# Patient Record
Sex: MALE | Race: OTHER | NOT HISPANIC OR LATINO | URBAN - METROPOLITAN AREA
[De-identification: names, ages, dates, MRNs, and addresses within clinical notes are randomized per-mention and may not be internally consistent; named-entity substitution may affect disease eponyms.]

---

## 2024-06-03 VITALS
OXYGEN SATURATION: 96 % | WEIGHT: 214.29 LBS | HEART RATE: 78 BPM | SYSTOLIC BLOOD PRESSURE: 182 MMHG | HEIGHT: 68 IN | TEMPERATURE: 99 F | RESPIRATION RATE: 16 BRPM | DIASTOLIC BLOOD PRESSURE: 97 MMHG

## 2024-06-03 RX ORDER — POVIDONE-IODINE 5 %
1 AEROSOL (ML) TOPICAL ONCE
Refills: 0 | Status: COMPLETED | OUTPATIENT
Start: 2024-06-04 | End: 2024-06-04

## 2024-06-03 NOTE — H&P ADULT - NSHPLABSRESULTS_GEN_ALL_CORE
Preop CBC, BMP, UA - WNL per medical clearance   Coags DOS  Cr .83   A1C 7.8   H/H 13.7/39.1  Preop EKG - WNL per medical clearance    DOS

## 2024-06-03 NOTE — H&P ADULT - NSHPPHYSICALEXAM_GEN_ALL_CORE
MSK: + decreased ROM 2/2 pain, thoracolumbar spine       Remainder of exam per medical clearance note MSK: + decreased ROM 2/2 pain, thoracolumbar spine   Skin warm and well perfused, no visible wounds/erythema/ecchymoses  FHL/GS 4/5 bilateral lower extremities  Right lower extremity: EHL 2/5, TA 2/5   Left lower extremity:  EHL 3/5, TA 3/5   SLT in tact to distal bilateral lower extremities    DP pulses palpable bilaterally   Remainder of exam per medical clearance note

## 2024-06-03 NOTE — H&P ADULT - PROBLEM SELECTOR PLAN 1
Admit to Orthopaedic Service.  Presents today for elective T9-T12 Decompression, removal of implants, exploration of fusion, posterior spinal fusion T8-S1 with sacropelvic fixation  Pt medically stable and cleared for procedure today by Dr. Fontenot Admit to Orthopaedic Service.  Presents today for elective  L2-S1 decompression, L5-S1 transforaminal lumbar interbody fusion, posterior spinal fusion  Pt medically stable and cleared for procedure today by Dr. Fontenot

## 2024-06-03 NOTE — PATIENT PROFILE ADULT - FALL HARM RISK - UNIVERSAL INTERVENTIONS
Bed in lowest position, wheels locked, appropriate side rails in place/Call bell, personal items and telephone in reach/Instruct patient to call for assistance before getting out of bed or chair/Non-slip footwear when patient is out of bed/[place] to call system/Physically safe environment - no spills, clutter or unnecessary equipment/Purposeful Proactive Rounding/Room/bathroom lighting operational, light cord in reach

## 2024-06-03 NOTE — H&P ADULT - HISTORY OF PRESENT ILLNESS
63M c/o back pain x       Present for elective T9-T12 Decompression, removal of implants, exploration of fusion, posterior spinal fusion T8-S1 with sacropelvic fixation 63M c/o back pain x chronic. Pt has hx of prior spine surgery in 1999. Reports since then he has had increasing weakness in bilateral feet. He denies numbness/tingling of bilateral lower extremities. He does not ambulate with an assistive device at baseline. Denies DVT hx; denies CP,SOB,N/V, tactile fevers, calf pain.       Present for elective L2-S1 decompression, L5-S1 transforaminal lumbar interbody fusion, posterior spinal fusion

## 2024-06-04 ENCOUNTER — INPATIENT (INPATIENT)
Facility: HOSPITAL | Age: 64
LOS: 1 days | Discharge: HOME | DRG: 460 | End: 2024-06-06
Attending: ORTHOPAEDIC SURGERY | Admitting: ORTHOPAEDIC SURGERY
Payer: MEDICARE

## 2024-06-04 DIAGNOSIS — M54.9 DORSALGIA, UNSPECIFIED: ICD-10-CM

## 2024-06-04 DIAGNOSIS — Z98.890 OTHER SPECIFIED POSTPROCEDURAL STATES: Chronic | ICD-10-CM

## 2024-06-04 DIAGNOSIS — Z85.46 PERSONAL HISTORY OF MALIGNANT NEOPLASM OF PROSTATE: Chronic | ICD-10-CM

## 2024-06-04 LAB
BASE EXCESS BLDA CALC-SCNC: -3 MMOL/L — LOW (ref -2–3)
BLD GP AB SCN SERPL QL: NEGATIVE — SIGNIFICANT CHANGE UP
CA-I BLDA-SCNC: 1.17 MMOL/L — SIGNIFICANT CHANGE UP (ref 1.15–1.33)
CO2 BLDA-SCNC: 22 MMOL/L — SIGNIFICANT CHANGE UP (ref 19–24)
COHGB MFR BLDA: 1.9 % — SIGNIFICANT CHANGE UP
GLUCOSE BLDA-MCNC: 176 MG/DL — HIGH (ref 70–99)
GLUCOSE BLDC GLUCOMTR-MCNC: 170 MG/DL — HIGH (ref 70–99)
GLUCOSE BLDC GLUCOMTR-MCNC: 224 MG/DL — HIGH (ref 70–99)
GLUCOSE BLDC GLUCOMTR-MCNC: 225 MG/DL — HIGH (ref 70–99)
HCO3 BLDA-SCNC: 21 MMOL/L — SIGNIFICANT CHANGE UP (ref 21–28)
HGB BLDA-MCNC: 12.2 G/DL — LOW (ref 12.6–17.4)
METHGB MFR BLDA: 0.6 % — SIGNIFICANT CHANGE UP
OXYHGB MFR BLDA: 97.5 % — HIGH (ref 90–95)
PCO2 BLDA: 33 MMHG — LOW (ref 35–48)
PH BLDA: 7.41 — SIGNIFICANT CHANGE UP (ref 7.35–7.45)
PO2 BLDA: 396 MMHG — HIGH (ref 83–108)
POTASSIUM BLDA-SCNC: 4.4 MMOL/L — SIGNIFICANT CHANGE UP (ref 3.5–5.1)
RH IG SCN BLD-IMP: POSITIVE — SIGNIFICANT CHANGE UP
SAO2 % BLDA: 100 % — HIGH (ref 94–98)
SODIUM BLDA-SCNC: 137 MMOL/L — SIGNIFICANT CHANGE UP (ref 136–145)

## 2024-06-04 PROCEDURE — 63030 LAMOT DCMPRN NRV RT 1 LMBR: CPT | Mod: AS,50,59

## 2024-06-04 PROCEDURE — 22630 ARTHRD PST TQ 1NTRSPC LUM: CPT | Mod: AS

## 2024-06-04 PROCEDURE — 63035 LAMOT DCMPRN NRV RT EA ADDL: CPT | Mod: AS,50,59

## 2024-06-04 DEVICE — SURGIFLO HEMOSTATIC MATRIX KIT: Type: IMPLANTABLE DEVICE | Status: FUNCTIONAL

## 2024-06-04 DEVICE — SURGIFOAM PAD 8CM X 12.5CM X 10MM (100): Type: IMPLANTABLE DEVICE | Status: FUNCTIONAL

## 2024-06-04 DEVICE — IMPLANTABLE DEVICE: Type: IMPLANTABLE DEVICE | Status: FUNCTIONAL

## 2024-06-04 DEVICE — SCREW SET: Type: IMPLANTABLE DEVICE | Status: FUNCTIONAL

## 2024-06-04 DEVICE — SCREW SOLERA 7.5X45X5.5: Type: IMPLANTABLE DEVICE | Status: FUNCTIONAL

## 2024-06-04 RX ORDER — SODIUM CHLORIDE 9 MG/ML
1000 INJECTION, SOLUTION INTRAVENOUS
Refills: 0 | Status: DISCONTINUED | OUTPATIENT
Start: 2024-06-04 | End: 2024-06-06

## 2024-06-04 RX ORDER — OXYCODONE HYDROCHLORIDE 5 MG/1
10 TABLET ORAL EVERY 4 HOURS
Refills: 0 | Status: DISCONTINUED | OUTPATIENT
Start: 2024-06-04 | End: 2024-06-06

## 2024-06-04 RX ORDER — RAMIPRIL 5 MG
0 CAPSULE ORAL
Refills: 0 | DISCHARGE

## 2024-06-04 RX ORDER — ENOXAPARIN SODIUM 100 MG/ML
40 INJECTION SUBCUTANEOUS EVERY 24 HOURS
Refills: 0 | Status: DISCONTINUED | OUTPATIENT
Start: 2024-06-05 | End: 2024-06-06

## 2024-06-04 RX ORDER — CHLORHEXIDINE GLUCONATE 213 G/1000ML
1 SOLUTION TOPICAL EVERY 12 HOURS
Refills: 0 | Status: DISCONTINUED | OUTPATIENT
Start: 2024-06-04 | End: 2024-06-04

## 2024-06-04 RX ORDER — CEFAZOLIN SODIUM 1 G
2000 VIAL (EA) INJECTION EVERY 8 HOURS
Refills: 0 | Status: COMPLETED | OUTPATIENT
Start: 2024-06-04 | End: 2024-06-05

## 2024-06-04 RX ORDER — HYDROMORPHONE HYDROCHLORIDE 2 MG/ML
0.5 INJECTION INTRAMUSCULAR; INTRAVENOUS; SUBCUTANEOUS
Refills: 0 | Status: DISCONTINUED | OUTPATIENT
Start: 2024-06-04 | End: 2024-06-06

## 2024-06-04 RX ORDER — MONTELUKAST 4 MG/1
10 TABLET, CHEWABLE ORAL DAILY
Refills: 0 | Status: DISCONTINUED | OUTPATIENT
Start: 2024-06-04 | End: 2024-06-06

## 2024-06-04 RX ORDER — ATORVASTATIN CALCIUM 80 MG/1
40 TABLET, FILM COATED ORAL AT BEDTIME
Refills: 0 | Status: DISCONTINUED | OUTPATIENT
Start: 2024-06-04 | End: 2024-06-06

## 2024-06-04 RX ORDER — NEBIVOLOL HYDROCHLORIDE 5 MG/1
1 TABLET ORAL
Refills: 0 | DISCHARGE

## 2024-06-04 RX ORDER — ATORVASTATIN CALCIUM 80 MG/1
1 TABLET, FILM COATED ORAL
Refills: 0 | DISCHARGE

## 2024-06-04 RX ORDER — SENNA PLUS 8.6 MG/1
2 TABLET ORAL AT BEDTIME
Refills: 0 | Status: DISCONTINUED | OUTPATIENT
Start: 2024-06-04 | End: 2024-06-06

## 2024-06-04 RX ORDER — DULAGLUTIDE 4.5 MG/.5ML
1.5 INJECTION, SOLUTION SUBCUTANEOUS
Refills: 0 | DISCHARGE

## 2024-06-04 RX ORDER — LISINOPRIL 2.5 MG/1
20 TABLET ORAL DAILY
Refills: 0 | Status: DISCONTINUED | OUTPATIENT
Start: 2024-06-04 | End: 2024-06-06

## 2024-06-04 RX ORDER — PANTOPRAZOLE SODIUM 20 MG/1
40 TABLET, DELAYED RELEASE ORAL
Refills: 0 | Status: DISCONTINUED | OUTPATIENT
Start: 2024-06-04 | End: 2024-06-06

## 2024-06-04 RX ORDER — POLYETHYLENE GLYCOL 3350 17 G/17G
17 POWDER, FOR SOLUTION ORAL
Refills: 0 | Status: DISCONTINUED | OUTPATIENT
Start: 2024-06-04 | End: 2024-06-06

## 2024-06-04 RX ORDER — OXYCODONE HYDROCHLORIDE 5 MG/1
5 TABLET ORAL EVERY 4 HOURS
Refills: 0 | Status: DISCONTINUED | OUTPATIENT
Start: 2024-06-04 | End: 2024-06-06

## 2024-06-04 RX ORDER — INSULIN LISPRO 100/ML
VIAL (ML) SUBCUTANEOUS
Refills: 0 | Status: DISCONTINUED | OUTPATIENT
Start: 2024-06-04 | End: 2024-06-06

## 2024-06-04 RX ORDER — APREPITANT 80 MG/1
40 CAPSULE ORAL ONCE
Refills: 0 | Status: COMPLETED | OUTPATIENT
Start: 2024-06-04 | End: 2024-06-04

## 2024-06-04 RX ORDER — DEXTROSE 10 % IN WATER 10 %
125 INTRAVENOUS SOLUTION INTRAVENOUS ONCE
Refills: 0 | Status: DISCONTINUED | OUTPATIENT
Start: 2024-06-04 | End: 2024-06-06

## 2024-06-04 RX ORDER — DORZOLAMIDE HYDROCHLORIDE TIMOLOL MALEATE 20; 5 MG/ML; MG/ML
1 SOLUTION/ DROPS OPHTHALMIC
Refills: 0 | DISCHARGE

## 2024-06-04 RX ORDER — DORZOLAMIDE HYDROCHLORIDE TIMOLOL MALEATE 20; 5 MG/ML; MG/ML
1 SOLUTION/ DROPS OPHTHALMIC
Refills: 0 | Status: DISCONTINUED | OUTPATIENT
Start: 2024-06-04 | End: 2024-06-06

## 2024-06-04 RX ORDER — FEBUXOSTAT 40 MG/1
1 TABLET ORAL
Refills: 0 | DISCHARGE

## 2024-06-04 RX ORDER — DEXTROSE 50 % IN WATER 50 %
12.5 SYRINGE (ML) INTRAVENOUS ONCE
Refills: 0 | Status: DISCONTINUED | OUTPATIENT
Start: 2024-06-04 | End: 2024-06-06

## 2024-06-04 RX ORDER — ACETAMINOPHEN 500 MG
1000 TABLET ORAL EVERY 8 HOURS
Refills: 0 | Status: DISCONTINUED | OUTPATIENT
Start: 2024-06-04 | End: 2024-06-06

## 2024-06-04 RX ORDER — METFORMIN HYDROCHLORIDE 850 MG/1
1 TABLET ORAL
Refills: 0 | DISCHARGE

## 2024-06-04 RX ORDER — HYDROMORPHONE HYDROCHLORIDE 2 MG/ML
0.5 INJECTION INTRAMUSCULAR; INTRAVENOUS; SUBCUTANEOUS EVERY 4 HOURS
Refills: 0 | Status: DISCONTINUED | OUTPATIENT
Start: 2024-06-04 | End: 2024-06-06

## 2024-06-04 RX ORDER — DEXTROSE 50 % IN WATER 50 %
15 SYRINGE (ML) INTRAVENOUS ONCE
Refills: 0 | Status: DISCONTINUED | OUTPATIENT
Start: 2024-06-04 | End: 2024-06-06

## 2024-06-04 RX ORDER — DEXTROSE 50 % IN WATER 50 %
25 SYRINGE (ML) INTRAVENOUS ONCE
Refills: 0 | Status: DISCONTINUED | OUTPATIENT
Start: 2024-06-04 | End: 2024-06-06

## 2024-06-04 RX ORDER — METHOCARBAMOL 500 MG/1
500 TABLET, FILM COATED ORAL EVERY 8 HOURS
Refills: 0 | Status: DISCONTINUED | OUTPATIENT
Start: 2024-06-04 | End: 2024-06-06

## 2024-06-04 RX ORDER — MONTELUKAST 4 MG/1
1 TABLET, CHEWABLE ORAL
Refills: 0 | DISCHARGE

## 2024-06-04 RX ORDER — DEXAMETHASONE 0.5 MG/5ML
4 ELIXIR ORAL EVERY 6 HOURS
Refills: 0 | Status: DISCONTINUED | OUTPATIENT
Start: 2024-06-04 | End: 2024-06-05

## 2024-06-04 RX ORDER — ACETAMINOPHEN 500 MG
1000 TABLET ORAL ONCE
Refills: 0 | Status: COMPLETED | OUTPATIENT
Start: 2024-06-04 | End: 2024-06-04

## 2024-06-04 RX ORDER — ONDANSETRON 8 MG/1
4 TABLET, FILM COATED ORAL EVERY 6 HOURS
Refills: 0 | Status: DISCONTINUED | OUTPATIENT
Start: 2024-06-04 | End: 2024-06-06

## 2024-06-04 RX ORDER — GLUCAGON INJECTION, SOLUTION 0.5 MG/.1ML
1 INJECTION, SOLUTION SUBCUTANEOUS ONCE
Refills: 0 | Status: DISCONTINUED | OUTPATIENT
Start: 2024-06-04 | End: 2024-06-06

## 2024-06-04 RX ORDER — BIMATOPROST 0.3 MG/ML
1 SOLUTION/ DROPS OPHTHALMIC
Refills: 0 | DISCHARGE

## 2024-06-04 RX ORDER — LATANOPROST 0.05 MG/ML
1 SOLUTION/ DROPS OPHTHALMIC; TOPICAL AT BEDTIME
Refills: 0 | Status: DISCONTINUED | OUTPATIENT
Start: 2024-06-04 | End: 2024-06-06

## 2024-06-04 RX ORDER — SEMAGLUTIDE 0.68 MG/ML
1 INJECTION, SOLUTION SUBCUTANEOUS
Refills: 0 | DISCHARGE

## 2024-06-04 RX ADMIN — Medication 100 MILLIGRAM(S): at 21:20

## 2024-06-04 RX ADMIN — Medication 4 MILLIGRAM(S): at 18:12

## 2024-06-04 RX ADMIN — Medication 1000 MILLIGRAM(S): at 21:19

## 2024-06-04 RX ADMIN — OXYCODONE HYDROCHLORIDE 10 MILLIGRAM(S): 5 TABLET ORAL at 19:53

## 2024-06-04 RX ADMIN — DORZOLAMIDE HYDROCHLORIDE TIMOLOL MALEATE 1 DROP(S): 20; 5 SOLUTION/ DROPS OPHTHALMIC at 19:22

## 2024-06-04 RX ADMIN — OXYCODONE HYDROCHLORIDE 10 MILLIGRAM(S): 5 TABLET ORAL at 20:47

## 2024-06-04 RX ADMIN — Medication 4: at 22:38

## 2024-06-04 RX ADMIN — Medication 1 APPLICATION(S): at 06:52

## 2024-06-04 RX ADMIN — ATORVASTATIN CALCIUM 40 MILLIGRAM(S): 80 TABLET, FILM COATED ORAL at 21:20

## 2024-06-04 RX ADMIN — Medication 1000 MILLIGRAM(S): at 06:50

## 2024-06-04 RX ADMIN — CHLORHEXIDINE GLUCONATE 1 APPLICATION(S): 213 SOLUTION TOPICAL at 06:51

## 2024-06-04 RX ADMIN — METHOCARBAMOL 500 MILLIGRAM(S): 500 TABLET, FILM COATED ORAL at 21:19

## 2024-06-04 RX ADMIN — APREPITANT 40 MILLIGRAM(S): 80 CAPSULE ORAL at 06:50

## 2024-06-04 RX ADMIN — Medication 50 MILLIGRAM(S): at 21:19

## 2024-06-04 NOTE — PRE-ANESTHESIA EVALUATION ADULT - NSANTHOSAYNRD_GEN_A_CORE
no
No. CASE screening performed.  STOP BANG Legend: 0-2 = LOW Risk; 3-4 = INTERMEDIATE Risk; 5-8 = HIGH Risk

## 2024-06-04 NOTE — PHYSICAL THERAPY INITIAL EVALUATION ADULT - ADDITIONAL COMMENTS
Patient lives in private home with 2 MYLES and 1 flight inside. Patient has tub shower with grab bars and shower chair. Owns RW, SC, and commode

## 2024-06-04 NOTE — PHYSICAL THERAPY INITIAL EVALUATION ADULT - GAIT DEVIATIONS NOTED, PT EVAL
patient with mildly unsteady gait, no LOB, verbal cues for sequencing and management of RW, gait distance limited by lethargy/decreased lennie/decreased step length

## 2024-06-04 NOTE — PHYSICAL THERAPY INITIAL EVALUATION ADULT - PERTINENT HX OF CURRENT PROBLEM, REHAB EVAL
Patient is a 63 year old male c/o back pain x chronic. Pt has hx of prior spine surgery in 1999. Reports since then he has had increasing weakness in bilateral feet. He denies numbness/tingling of bilateral lower extremities. He does not ambulate with an assistive device at baseline. Denies DVT hx; denies CP,SOB,N/V, tactile fevers, calf pain.

## 2024-06-04 NOTE — BRIEF OPERATIVE NOTE - NSICDXBRIEFPROCEDURE_GEN_ALL_CORE_FT
PROCEDURES:  Fusion, posterior spinal column, lumbar, 1 level, posterior approach 04-Jun-2024 16:45:57 L2-S1 decompression Ortega Zimmerman

## 2024-06-04 NOTE — PHYSICAL THERAPY INITIAL EVALUATION ADULT - GENERAL OBSERVATIONS, REHAB EVAL
MAYNOR Douglas aware of intent to treat. Patient received semi-supine in NAD with +ECG +heplock +newton +bailey +hemovac x 2 +SHASHI x 1 MAYNOR Douglas aware of intent to treat. Patient received semi-supine in NAD with +ECG +heplock +newton +bailey +hemovac x 2 +SHASHI x 1 +LSO donned in sitting

## 2024-06-04 NOTE — PHYSICAL THERAPY INITIAL EVALUATION ADULT - MANUAL MUSCLE TESTING RESULTS, REHAB EVAL
BUE 5/5, L hip flexion 5/5, R hip flexion 4/5, L knee extension 5/5, R knee extension 4/5, L ankle DF/PF 5/5, R ankle DF 3+/5, L EHL 3/5, R EHL 1/5

## 2024-06-05 DIAGNOSIS — T78.40XA ALLERGY, UNSPECIFIED, INITIAL ENCOUNTER: ICD-10-CM

## 2024-06-05 DIAGNOSIS — D69.6 THROMBOCYTOPENIA, UNSPECIFIED: ICD-10-CM

## 2024-06-05 DIAGNOSIS — M48.061 SPINAL STENOSIS, LUMBAR REGION WITHOUT NEUROGENIC CLAUDICATION: ICD-10-CM

## 2024-06-05 DIAGNOSIS — I10 ESSENTIAL (PRIMARY) HYPERTENSION: ICD-10-CM

## 2024-06-05 DIAGNOSIS — D62 ACUTE POSTHEMORRHAGIC ANEMIA: ICD-10-CM

## 2024-06-05 DIAGNOSIS — H40.9 UNSPECIFIED GLAUCOMA: ICD-10-CM

## 2024-06-05 DIAGNOSIS — E11.9 TYPE 2 DIABETES MELLITUS WITHOUT COMPLICATIONS: ICD-10-CM

## 2024-06-05 DIAGNOSIS — M10.9 GOUT, UNSPECIFIED: ICD-10-CM

## 2024-06-05 LAB
ANION GAP SERPL CALC-SCNC: 10 MMOL/L — SIGNIFICANT CHANGE UP (ref 5–17)
APTT BLD: 24.6 SEC — SIGNIFICANT CHANGE UP (ref 24.5–35.6)
BASOPHILS # BLD AUTO: 0 K/UL — SIGNIFICANT CHANGE UP (ref 0–0.2)
BASOPHILS NFR BLD AUTO: 0 % — SIGNIFICANT CHANGE UP (ref 0–2)
BUN SERPL-MCNC: 14 MG/DL — SIGNIFICANT CHANGE UP (ref 7–23)
CALCIUM SERPL-MCNC: 8.5 MG/DL — SIGNIFICANT CHANGE UP (ref 8.4–10.5)
CHLORIDE SERPL-SCNC: 106 MMOL/L — SIGNIFICANT CHANGE UP (ref 96–108)
CO2 SERPL-SCNC: 23 MMOL/L — SIGNIFICANT CHANGE UP (ref 22–31)
CREAT SERPL-MCNC: 0.77 MG/DL — SIGNIFICANT CHANGE UP (ref 0.5–1.3)
EGFR: 99 ML/MIN/1.73M2 — SIGNIFICANT CHANGE UP
EOSINOPHIL # BLD AUTO: 0 K/UL — SIGNIFICANT CHANGE UP (ref 0–0.5)
EOSINOPHIL NFR BLD AUTO: 0 % — SIGNIFICANT CHANGE UP (ref 0–6)
GLUCOSE BLDC GLUCOMTR-MCNC: 195 MG/DL — HIGH (ref 70–99)
GLUCOSE BLDC GLUCOMTR-MCNC: 196 MG/DL — HIGH (ref 70–99)
GLUCOSE BLDC GLUCOMTR-MCNC: 221 MG/DL — HIGH (ref 70–99)
GLUCOSE BLDC GLUCOMTR-MCNC: 255 MG/DL — HIGH (ref 70–99)
GLUCOSE SERPL-MCNC: 190 MG/DL — HIGH (ref 70–99)
HCT VFR BLD CALC: 29.7 % — LOW (ref 39–50)
HGB BLD-MCNC: 9.9 G/DL — LOW (ref 13–17)
IMM GRANULOCYTES NFR BLD AUTO: 0.4 % — SIGNIFICANT CHANGE UP (ref 0–0.9)
INR BLD: 1.12 — SIGNIFICANT CHANGE UP (ref 0.85–1.18)
LYMPHOCYTES # BLD AUTO: 0.96 K/UL — LOW (ref 1–3.3)
LYMPHOCYTES # BLD AUTO: 9.8 % — LOW (ref 13–44)
MCHC RBC-ENTMCNC: 31.5 PG — SIGNIFICANT CHANGE UP (ref 27–34)
MCHC RBC-ENTMCNC: 33.3 GM/DL — SIGNIFICANT CHANGE UP (ref 32–36)
MCV RBC AUTO: 94.6 FL — SIGNIFICANT CHANGE UP (ref 80–100)
MONOCYTES # BLD AUTO: 0.63 K/UL — SIGNIFICANT CHANGE UP (ref 0–0.9)
MONOCYTES NFR BLD AUTO: 6.4 % — SIGNIFICANT CHANGE UP (ref 2–14)
NEUTROPHILS # BLD AUTO: 8.21 K/UL — HIGH (ref 1.8–7.4)
NEUTROPHILS NFR BLD AUTO: 83.4 % — HIGH (ref 43–77)
NRBC # BLD: 0 /100 WBCS — SIGNIFICANT CHANGE UP (ref 0–0)
PLATELET # BLD AUTO: 144 K/UL — LOW (ref 150–400)
POTASSIUM SERPL-MCNC: 3.7 MMOL/L — SIGNIFICANT CHANGE UP (ref 3.5–5.3)
POTASSIUM SERPL-SCNC: 3.7 MMOL/L — SIGNIFICANT CHANGE UP (ref 3.5–5.3)
PROTHROM AB SERPL-ACNC: 12.7 SEC — SIGNIFICANT CHANGE UP (ref 9.5–13)
RBC # BLD: 3.14 M/UL — LOW (ref 4.2–5.8)
RBC # FLD: 12.6 % — SIGNIFICANT CHANGE UP (ref 10.3–14.5)
SODIUM SERPL-SCNC: 139 MMOL/L — SIGNIFICANT CHANGE UP (ref 135–145)
WBC # BLD: 9.84 K/UL — SIGNIFICANT CHANGE UP (ref 3.8–10.5)
WBC # FLD AUTO: 9.84 K/UL — SIGNIFICANT CHANGE UP (ref 3.8–10.5)

## 2024-06-05 PROCEDURE — 99223 1ST HOSP IP/OBS HIGH 75: CPT

## 2024-06-05 RX ORDER — NEBIVOLOL HYDROCHLORIDE 5 MG/1
10 TABLET ORAL DAILY
Refills: 0 | Status: DISCONTINUED | OUTPATIENT
Start: 2024-06-05 | End: 2024-06-06

## 2024-06-05 RX ORDER — DEXAMETHASONE 0.5 MG/5ML
2 ELIXIR ORAL EVERY 8 HOURS
Refills: 0 | Status: COMPLETED | OUTPATIENT
Start: 2024-06-05 | End: 2024-06-06

## 2024-06-05 RX ORDER — ALLOPURINOL 300 MG
100 TABLET ORAL DAILY
Refills: 0 | Status: DISCONTINUED | OUTPATIENT
Start: 2024-06-05 | End: 2024-06-06

## 2024-06-05 RX ORDER — INSULIN GLARGINE 100 [IU]/ML
5 INJECTION, SOLUTION SUBCUTANEOUS AT BEDTIME
Refills: 0 | Status: DISCONTINUED | OUTPATIENT
Start: 2024-06-05 | End: 2024-06-06

## 2024-06-05 RX ADMIN — METHOCARBAMOL 500 MILLIGRAM(S): 500 TABLET, FILM COATED ORAL at 22:26

## 2024-06-05 RX ADMIN — DORZOLAMIDE HYDROCHLORIDE TIMOLOL MALEATE 1 DROP(S): 20; 5 SOLUTION/ DROPS OPHTHALMIC at 17:26

## 2024-06-05 RX ADMIN — DORZOLAMIDE HYDROCHLORIDE TIMOLOL MALEATE 1 DROP(S): 20; 5 SOLUTION/ DROPS OPHTHALMIC at 05:25

## 2024-06-05 RX ADMIN — Medication 50 MILLIGRAM(S): at 22:27

## 2024-06-05 RX ADMIN — Medication 2: at 22:26

## 2024-06-05 RX ADMIN — INSULIN GLARGINE 5 UNIT(S): 100 INJECTION, SOLUTION SUBCUTANEOUS at 22:28

## 2024-06-05 RX ADMIN — Medication 4 MILLIGRAM(S): at 00:06

## 2024-06-05 RX ADMIN — NEBIVOLOL HYDROCHLORIDE 10 MILLIGRAM(S): 5 TABLET ORAL at 13:30

## 2024-06-05 RX ADMIN — Medication 100 MILLIGRAM(S): at 05:25

## 2024-06-05 RX ADMIN — LISINOPRIL 20 MILLIGRAM(S): 2.5 TABLET ORAL at 08:47

## 2024-06-05 RX ADMIN — METHOCARBAMOL 500 MILLIGRAM(S): 500 TABLET, FILM COATED ORAL at 05:26

## 2024-06-05 RX ADMIN — Medication 6: at 17:26

## 2024-06-05 RX ADMIN — Medication 4 MILLIGRAM(S): at 05:26

## 2024-06-05 RX ADMIN — METHOCARBAMOL 500 MILLIGRAM(S): 500 TABLET, FILM COATED ORAL at 13:14

## 2024-06-05 RX ADMIN — Medication 100 MILLIGRAM(S): at 12:31

## 2024-06-05 RX ADMIN — PANTOPRAZOLE SODIUM 40 MILLIGRAM(S): 20 TABLET, DELAYED RELEASE ORAL at 05:26

## 2024-06-05 RX ADMIN — Medication 2: at 07:04

## 2024-06-05 RX ADMIN — Medication 1000 MILLIGRAM(S): at 22:27

## 2024-06-05 RX ADMIN — Medication 2 MILLIGRAM(S): at 22:26

## 2024-06-05 RX ADMIN — Medication 2 MILLIGRAM(S): at 13:14

## 2024-06-05 RX ADMIN — ATORVASTATIN CALCIUM 40 MILLIGRAM(S): 80 TABLET, FILM COATED ORAL at 22:26

## 2024-06-05 RX ADMIN — Medication 1000 MILLIGRAM(S): at 13:14

## 2024-06-05 RX ADMIN — Medication 4: at 13:13

## 2024-06-05 RX ADMIN — Medication 1000 MILLIGRAM(S): at 05:25

## 2024-06-05 RX ADMIN — MONTELUKAST 10 MILLIGRAM(S): 4 TABLET, CHEWABLE ORAL at 13:15

## 2024-06-05 RX ADMIN — ENOXAPARIN SODIUM 40 MILLIGRAM(S): 100 INJECTION SUBCUTANEOUS at 22:27

## 2024-06-05 RX ADMIN — Medication 50 MILLIGRAM(S): at 05:26

## 2024-06-05 RX ADMIN — Medication 50 MILLIGRAM(S): at 13:14

## 2024-06-05 NOTE — OCCUPATIONAL THERAPY INITIAL EVALUATION ADULT - PERTINENT HX OF CURRENT PROBLEM, REHAB EVAL
63M c/o back pain x chronic. Pt has hx of prior spine surgery in 1999. Reports since then he has had increasing weakness in bilateral feet. He denies numbness/tingling of bilateral lower extremities. He does not ambulate with an assistive device at baseline. Denies DVT hx; denies CP,SOB,N/V, tactile fevers, calf pain.

## 2024-06-05 NOTE — CONSULT NOTE ADULT - SUBJECTIVE AND OBJECTIVE BOX
HPI: 63 YOM with PMH of T2DM, HTN, gout, glaucoma, allergies admitted for elective L2-S1 decompression, L5-S1 PSF/TLIF s/p OR with Dr. Crain on 6/5/24. Patient seen in bed this morning, states pain has not been well controlled and 8/10 at time of my exam. Denies fever, vision changes, hearing changes, rhinorrhea, sore throat, chest pain, palpitations, cough, SOB, abd pain, N/V/D, rash, numbness/weakness/tingling, HA, LOC. Still with bailey which he states he would like removed ASAP - plan for TOV after PT eval per primary.       ROS: negative except as per HPI    PMH: as per HPI  PSH: as per HPI  Medications: reviewed  Allergies: reviewed  SH:  FH:    Diet, Consistent Carbohydrate/No Snacks (06-05-24 @ 09:50) [Active]      MEDICATIONS:  MEDICATIONS  (STANDING):  acetaminophen     Tablet .. 1000 milliGRAM(s) Oral every 8 hours  allopurinol 100 milliGRAM(s) Oral daily  atorvastatin 40 milliGRAM(s) Oral at bedtime  dexAMETHasone  Injectable 2 milliGRAM(s) IV Push every 8 hours  dextrose 10% Bolus 125 milliLiter(s) IV Bolus once  dextrose 5%. 1000 milliLiter(s) (100 mL/Hr) IV Continuous <Continuous>  dextrose 5%. 1000 milliLiter(s) (50 mL/Hr) IV Continuous <Continuous>  dextrose 50% Injectable 12.5 Gram(s) IV Push once  dextrose 50% Injectable 25 Gram(s) IV Push once  dorzolamide 2%/timolol 0.5% Ophthalmic Solution 1 Drop(s) Both EYES two times a day  enoxaparin Injectable 40 milliGRAM(s) SubCutaneous every 24 hours  glucagon  Injectable 1 milliGRAM(s) IntraMuscular once  insulin lispro (ADMELOG) corrective regimen sliding scale   SubCutaneous Before meals and at bedtime  lactated ringers. 1000 milliLiter(s) (140 mL/Hr) IV Continuous <Continuous>  latanoprost 0.005% Ophthalmic Solution 1 Drop(s) Both EYES at bedtime  lisinopril 20 milliGRAM(s) Oral daily  methocarbamol 500 milliGRAM(s) Oral every 8 hours  montelukast 10 milliGRAM(s) Oral daily  nebivolol 10 milliGRAM(s) Oral daily  pantoprazole    Tablet 40 milliGRAM(s) Oral before breakfast  polyethylene glycol 3350 17 Gram(s) Oral two times a day  pregabalin 50 milliGRAM(s) Oral three times a day  senna 2 Tablet(s) Oral at bedtime    MEDICATIONS  (PRN):  aluminum hydroxide/magnesium hydroxide/simethicone Suspension 30 milliLiter(s) Oral every 12 hours PRN Indigestion  bisacodyl 5 milliGRAM(s) Oral every 12 hours PRN Constipation  dextrose Oral Gel 15 Gram(s) Oral once PRN Blood Glucose LESS THAN 70 milliGRAM(s)/deciliter  HYDROmorphone  Injectable 0.5 milliGRAM(s) IV Push every 15 minutes PRN breakthrough pain  HYDROmorphone  Injectable 0.5 milliGRAM(s) IV Push every 4 hours PRN breakthrough pain  ondansetron Injectable 4 milliGRAM(s) IV Push every 6 hours PRN Nausea and/or Vomiting  oxyCODONE    IR 5 milliGRAM(s) Oral every 4 hours PRN Moderate Pain (4 - 6)  oxyCODONE    IR 10 milliGRAM(s) Oral every 4 hours PRN Severe Pain (7 - 10)      Allergies    No Known Allergies    Intolerances        OBJECTIVE:  Vital Signs Last 24 Hrs  T(C): 36.9 (05 Jun 2024 12:25), Max: 36.9 (05 Jun 2024 12:25)  T(F): 98.4 (05 Jun 2024 12:25), Max: 98.4 (05 Jun 2024 12:25)  HR: 97 (05 Jun 2024 13:01) (74 - 110)  BP: 151/72 (05 Jun 2024 13:01) (111/57 - 191/104)  BP(mean): 96 (04 Jun 2024 18:35) (96 - 114)  RR: 18 (05 Jun 2024 12:25) (15 - 18)  SpO2: 98% (05 Jun 2024 12:25) (95% - 100%)    Parameters below as of 05 Jun 2024 12:25  Patient On (Oxygen Delivery Method): room air      I&O's Summary    04 Jun 2024 07:01  -  05 Jun 2024 07:00  --------------------------------------------------------  IN: 1540 mL / OUT: 1815 mL / NET: -275 mL    05 Jun 2024 07:01  -  05 Jun 2024 13:28  --------------------------------------------------------  IN: 0 mL / OUT: 1005 mL / NET: -1005 mL        PHYSICAL EXAM:  Gen: appears stated age, resting comfortably, NAD  HEENT: NCAT, MMM  Neck: supple  CV: RRR, no m/r/g, peripheral pulses 2+  Pulm: CTAB, no increased work of breathing, no rales/rhonchi  Abd: soft, ND, NT, no rebound or guarding  Skin: warm and dry  Ext: non-tender, no edema; lumbar dressing CDI with drains in place  Neuro: AOx3, speaking in full sentences  Psych: affect and behavior appropriate, pleasant at time of interview    LABS:                        9.9    9.84  )-----------( 144      ( 05 Jun 2024 07:00 )             29.7     06-05    139  |  106  |  14  ----------------------------<  190<H>  3.7   |  23  |  0.77    Ca    8.5      05 Jun 2024 07:00        PT/INR - ( 05 Jun 2024 07:00 )   PT: 12.7 sec;   INR: 1.12          PTT - ( 05 Jun 2024 07:00 )  PTT:24.6 sec  CAPILLARY BLOOD GLUCOSE      POCT Blood Glucose.: 221 mg/dL (05 Jun 2024 12:27)  POCT Blood Glucose.: 196 mg/dL (05 Jun 2024 06:57)  POCT Blood Glucose.: 224 mg/dL (04 Jun 2024 22:12)  POCT Blood Glucose.: 225 mg/dL (04 Jun 2024 16:38)    Urinalysis Basic - ( 05 Jun 2024 07:00 )    Color: x / Appearance: x / SG: x / pH: x  Gluc: 190 mg/dL / Ketone: x  / Bili: x / Urobili: x   Blood: x / Protein: x / Nitrite: x   Leuk Esterase: x / RBC: x / WBC x   Sq Epi: x / Non Sq Epi: x / Bacteria: x        MICRODATA:      RADIOLOGY/OTHER STUDIES:  Reviewed

## 2024-06-05 NOTE — CONSULT NOTE ADULT - ASSESSMENT
63 YOM with PMH of T2DM, HTN, gout, glaucoma, allergies, lumbar spinal stenosis admitted for elective L2-S1 decompression, L5-S1 PSF/TLIF s/p OR with Dr. Crain on 6/5/24.

## 2024-06-05 NOTE — OCCUPATIONAL THERAPY INITIAL EVALUATION ADULT - DIAGNOSIS, OT EVAL
Pt. is POD#1 (6/4) s/p L2-S1 decompression, and L5-S1 fusion. Upon assessment, pt. demo I/MI with appropriate AD/AE for ADLs and functional mob/transfers at this time. He can reiterate and follow spinal precautions. No further skilled OT needs, please f/u if change in status is noted.

## 2024-06-05 NOTE — CONSULT NOTE ADULT - PROBLEM SELECTOR RECOMMENDATION 5
Chronic. Home: nebivolol 10mg daily, ramipril 5mg daily  - cont nebivolol 10mg, lisinopril 20mg (therapeutic exchange)  - BP elevated this AM, likely that pain is contributing

## 2024-06-05 NOTE — CONSULT NOTE ADULT - NSCONSULTADDITIONALINFOA_GEN_ALL_CORE
-    Dispo: anticipate home pending drain removal, pain control. Can DC tele and transfer to [facility].    Recommendations and plan discussed with primary team.

## 2024-06-05 NOTE — CONSULT NOTE ADULT - PROBLEM SELECTOR RECOMMENDATION 9
Post operative state. Now /p OR with Dr. Crain on 6/5/24 for elective L2-S1 decompression, L5-S1 PSF/TLIF.   - management per ortho, three drains in place, receiving dexamethasone x24hr  - pain control with bowel regimen  - frequent perioperative incentive spirometer use  - early ambulation and OOBTC as tolerated  - chemical DVT ppx per primary - on LMWH  - plan for TOV today - monitor PVR

## 2024-06-05 NOTE — PROGRESS NOTE ADULT - SUBJECTIVE AND OBJECTIVE BOX
Ortho Post Op Check    Procedure:  L2-S1 decompression, L5-S1 PSF/TLIF   Surgeon: Dr. SAMANTHA Crain    Pt comfortable without complaints, pain controlled  Denies CP, SOB, N/V, numbness/tingling     Vital Signs Last 24 Hrs  T(C): 36.6 (06-05-24 @ 00:02), Max: 36.6 (06-05-24 @ 00:02)  T(F): 97.9 (06-05-24 @ 00:02), Max: 97.9 (06-05-24 @ 00:02)  HR: 80 (06-05-24 @ 00:02) (80 - 80)  BP: 161/85 (06-05-24 @ 00:02) (161/85 - 161/85)  BP(mean): --  RR: 18 (06-05-24 @ 00:02) (18 - 18)  SpO2: 99% (06-05-24 @ 00:02) (99% - 99%)    General: Pt Alert and oriented, NAD  DSG C/D/I  Drains: hv x 2, delphine x 1   Pulses: 2+ DP  FHL/GS 4/5 bilateral lower extremities  Right lower extremity: EHL 2/5, TA 2/5   Left lower extremity:  EHL 3/5, TA 3/5   SLT in tact to distal bilateral lower extremities    DP pulses palpable bilaterally     A/P: 63yMale s/p L2-S1 decompression, L5-S1 PSF/TLIF by Dr. SAMANTHA Crain on 06-05  - Stable  - Pain Control  - DVT ppx: SCDs  - Post op abx: Ancef  - Drains: HV x 2, DELPHINE x 1   - WBS: WBAT  - Baker in place   - PT eval     Ortho Pager [phone] Ortho Post Op Check    Procedure:  L2-S1 decompression, L5-S1 PSF/TLIF   Surgeon: Dr. SAMANTHA Crain    Pt comfortable without complaints, pain controlled  Denies CP, SOB, N/V, numbness/tingling     Vital Signs Last 24 Hrs  T(C): 36.6 (06-05-24 @ 00:02), Max: 36.6 (06-05-24 @ 00:02)  T(F): 97.9 (06-05-24 @ 00:02), Max: 97.9 (06-05-24 @ 00:02)  HR: 80 (06-05-24 @ 00:02) (80 - 80)  BP: 161/85 (06-05-24 @ 00:02) (161/85 - 161/85)  BP(mean): --  RR: 18 (06-05-24 @ 00:02) (18 - 18)  SpO2: 99% (06-05-24 @ 00:02) (99% - 99%)    General: Pt Alert and oriented, NAD  DSG C/D/I  Drains: hv x 2, delphine x 1   Pulses: 2+ DP  FHL/GS 4/5 bilateral lower extremities  Right lower extremity: EHL 3/5, TA 5/5   Left lower extremity:  EHL 4/5, TA 5/5   SLT in tact to distal bilateral lower extremities    DP pulses palpable bilaterally     A/P: 63yMale s/p L2-S1 decompression, L5-S1 PSF/TLIF by Dr. SAMANTHA Crain on 06-05  - Stable  - Pain Control  - DVT ppx: SCDs  - Post op abx: Ancef  - Drains: HV x 2, DELPHINE x 1   - WBS: WBAT  - Baker in place   - PT eval     Ortho Pager [phone]

## 2024-06-05 NOTE — CONSULT NOTE ADULT - PROBLEM SELECTOR RECOMMENDATION 4
HbA1C 7.8 on outpatient labs. Home: glipizide 5mg, metformin 1000mg BID, semaglutide 7mg daily, dulaglutide 1.5mg qweekly.  - start glargine 5u qhs, cont ISS with FSG ACHS  - expect FSG to reduce after discontinuation of steroids, will monitor closely

## 2024-06-05 NOTE — CONSULT NOTE ADULT - PROBLEM SELECTOR RECOMMENDATION 2
Acute blood loss anemia. HDS and asymptomatic w/o active s/s bleeding. Partially 2/2 operative losses  - Hgb 13.7 --> 9.9  - transfuse to maintain Hgb >7

## 2024-06-05 NOTE — OCCUPATIONAL THERAPY INITIAL EVALUATION ADULT - GENERAL OBSERVATIONS, REHAB EVAL
OT IE complete. MAYNOR Ribeiro clearing pt. for session. Pt. received semi-supine in bed, +tele, +SHASHI, +HVx2, +IV in NAD, no c/o pain, agreeable to therapy session.

## 2024-06-05 NOTE — OCCUPATIONAL THERAPY INITIAL EVALUATION ADULT - ADDITIONAL COMMENTS
Patient lives in private home with 2 MYLES and 1 flight inside where he was I prior in ADLs and functional mob/transfers.  Patient has tub shower with grab bars and shower chair. Owns RW, SC, and commode from previous surgeries. He wears glasses

## 2024-06-06 VITALS — WEIGHT: 214.07 LBS

## 2024-06-06 DIAGNOSIS — E87.6 HYPOKALEMIA: ICD-10-CM

## 2024-06-06 LAB
ANION GAP SERPL CALC-SCNC: 9 MMOL/L — SIGNIFICANT CHANGE UP (ref 5–17)
BASOPHILS # BLD AUTO: 0.01 K/UL — SIGNIFICANT CHANGE UP (ref 0–0.2)
BASOPHILS NFR BLD AUTO: 0.1 % — SIGNIFICANT CHANGE UP (ref 0–2)
BUN SERPL-MCNC: 14 MG/DL — SIGNIFICANT CHANGE UP (ref 7–23)
CALCIUM SERPL-MCNC: 8.4 MG/DL — SIGNIFICANT CHANGE UP (ref 8.4–10.5)
CHLORIDE SERPL-SCNC: 108 MMOL/L — SIGNIFICANT CHANGE UP (ref 96–108)
CO2 SERPL-SCNC: 25 MMOL/L — SIGNIFICANT CHANGE UP (ref 22–31)
CREAT SERPL-MCNC: 0.76 MG/DL — SIGNIFICANT CHANGE UP (ref 0.5–1.3)
EGFR: 100 ML/MIN/1.73M2 — SIGNIFICANT CHANGE UP
EOSINOPHIL # BLD AUTO: 0.01 K/UL — SIGNIFICANT CHANGE UP (ref 0–0.5)
EOSINOPHIL NFR BLD AUTO: 0.1 % — SIGNIFICANT CHANGE UP (ref 0–6)
GLUCOSE BLDC GLUCOMTR-MCNC: 174 MG/DL — HIGH (ref 70–99)
GLUCOSE BLDC GLUCOMTR-MCNC: 240 MG/DL — HIGH (ref 70–99)
GLUCOSE SERPL-MCNC: 160 MG/DL — HIGH (ref 70–99)
HCT VFR BLD CALC: 26.5 % — LOW (ref 39–50)
HGB BLD-MCNC: 9 G/DL — LOW (ref 13–17)
IMM GRANULOCYTES NFR BLD AUTO: 0.7 % — SIGNIFICANT CHANGE UP (ref 0–0.9)
LYMPHOCYTES # BLD AUTO: 1.77 K/UL — SIGNIFICANT CHANGE UP (ref 1–3.3)
LYMPHOCYTES # BLD AUTO: 17 % — SIGNIFICANT CHANGE UP (ref 13–44)
MCHC RBC-ENTMCNC: 31.9 PG — SIGNIFICANT CHANGE UP (ref 27–34)
MCHC RBC-ENTMCNC: 34 GM/DL — SIGNIFICANT CHANGE UP (ref 32–36)
MCV RBC AUTO: 94 FL — SIGNIFICANT CHANGE UP (ref 80–100)
MONOCYTES # BLD AUTO: 0.93 K/UL — HIGH (ref 0–0.9)
MONOCYTES NFR BLD AUTO: 8.9 % — SIGNIFICANT CHANGE UP (ref 2–14)
NEUTROPHILS # BLD AUTO: 7.61 K/UL — HIGH (ref 1.8–7.4)
NEUTROPHILS NFR BLD AUTO: 73.2 % — SIGNIFICANT CHANGE UP (ref 43–77)
NRBC # BLD: 0 /100 WBCS — SIGNIFICANT CHANGE UP (ref 0–0)
PLATELET # BLD AUTO: 138 K/UL — LOW (ref 150–400)
POTASSIUM SERPL-MCNC: 3.3 MMOL/L — LOW (ref 3.5–5.3)
POTASSIUM SERPL-SCNC: 3.3 MMOL/L — LOW (ref 3.5–5.3)
RBC # BLD: 2.82 M/UL — LOW (ref 4.2–5.8)
RBC # FLD: 13.2 % — SIGNIFICANT CHANGE UP (ref 10.3–14.5)
SODIUM SERPL-SCNC: 142 MMOL/L — SIGNIFICANT CHANGE UP (ref 135–145)
WBC # BLD: 10.4 K/UL — SIGNIFICANT CHANGE UP (ref 3.8–10.5)
WBC # FLD AUTO: 10.4 K/UL — SIGNIFICANT CHANGE UP (ref 3.8–10.5)

## 2024-06-06 PROCEDURE — 86901 BLOOD TYPING SEROLOGIC RH(D): CPT

## 2024-06-06 PROCEDURE — 84295 ASSAY OF SERUM SODIUM: CPT

## 2024-06-06 PROCEDURE — 72100 X-RAY EXAM L-S SPINE 2/3 VWS: CPT

## 2024-06-06 PROCEDURE — 86900 BLOOD TYPING SEROLOGIC ABO: CPT

## 2024-06-06 PROCEDURE — C1889: CPT

## 2024-06-06 PROCEDURE — C1713: CPT

## 2024-06-06 PROCEDURE — 80048 BASIC METABOLIC PNL TOTAL CA: CPT

## 2024-06-06 PROCEDURE — 76000 FLUOROSCOPY <1 HR PHYS/QHP: CPT

## 2024-06-06 PROCEDURE — 85730 THROMBOPLASTIN TIME PARTIAL: CPT

## 2024-06-06 PROCEDURE — 85610 PROTHROMBIN TIME: CPT

## 2024-06-06 PROCEDURE — 97116 GAIT TRAINING THERAPY: CPT

## 2024-06-06 PROCEDURE — 82330 ASSAY OF CALCIUM: CPT

## 2024-06-06 PROCEDURE — 97530 THERAPEUTIC ACTIVITIES: CPT

## 2024-06-06 PROCEDURE — 36415 COLL VENOUS BLD VENIPUNCTURE: CPT

## 2024-06-06 PROCEDURE — 85018 HEMOGLOBIN: CPT

## 2024-06-06 PROCEDURE — 84132 ASSAY OF SERUM POTASSIUM: CPT

## 2024-06-06 PROCEDURE — 85025 COMPLETE CBC W/AUTO DIFF WBC: CPT

## 2024-06-06 PROCEDURE — 97165 OT EVAL LOW COMPLEX 30 MIN: CPT

## 2024-06-06 PROCEDURE — 82947 ASSAY GLUCOSE BLOOD QUANT: CPT

## 2024-06-06 PROCEDURE — 82962 GLUCOSE BLOOD TEST: CPT

## 2024-06-06 PROCEDURE — 97161 PT EVAL LOW COMPLEX 20 MIN: CPT

## 2024-06-06 PROCEDURE — 72100 X-RAY EXAM L-S SPINE 2/3 VWS: CPT | Mod: 26

## 2024-06-06 PROCEDURE — 99232 SBSQ HOSP IP/OBS MODERATE 35: CPT | Mod: GC

## 2024-06-06 PROCEDURE — P9045: CPT

## 2024-06-06 PROCEDURE — 86850 RBC ANTIBODY SCREEN: CPT

## 2024-06-06 RX ORDER — POLYETHYLENE GLYCOL 3350 17 G/17G
17 POWDER, FOR SOLUTION ORAL
Qty: 0 | Refills: 0 | DISCHARGE
Start: 2024-06-06

## 2024-06-06 RX ORDER — OXYCODONE HYDROCHLORIDE 5 MG/1
1 TABLET ORAL
Qty: 28 | Refills: 0
Start: 2024-06-06 | End: 2024-06-12

## 2024-06-06 RX ORDER — ACETAMINOPHEN 500 MG
2 TABLET ORAL
Qty: 0 | Refills: 0 | DISCHARGE
Start: 2024-06-06

## 2024-06-06 RX ORDER — METHOCARBAMOL 500 MG/1
1 TABLET, FILM COATED ORAL
Qty: 15 | Refills: 0
Start: 2024-06-06 | End: 2024-06-10

## 2024-06-06 RX ORDER — INSULIN GLARGINE 100 [IU]/ML
8 INJECTION, SOLUTION SUBCUTANEOUS AT BEDTIME
Refills: 0 | Status: DISCONTINUED | OUTPATIENT
Start: 2024-06-06 | End: 2024-06-06

## 2024-06-06 RX ORDER — POTASSIUM CHLORIDE 20 MEQ
40 PACKET (EA) ORAL ONCE
Refills: 0 | Status: COMPLETED | OUTPATIENT
Start: 2024-06-06 | End: 2024-06-06

## 2024-06-06 RX ADMIN — Medication 50 MILLIGRAM(S): at 15:02

## 2024-06-06 RX ADMIN — OXYCODONE HYDROCHLORIDE 10 MILLIGRAM(S): 5 TABLET ORAL at 16:54

## 2024-06-06 RX ADMIN — Medication 100 MILLIGRAM(S): at 12:28

## 2024-06-06 RX ADMIN — MONTELUKAST 10 MILLIGRAM(S): 4 TABLET, CHEWABLE ORAL at 12:21

## 2024-06-06 RX ADMIN — Medication 2: at 07:27

## 2024-06-06 RX ADMIN — Medication 2 MILLIGRAM(S): at 05:07

## 2024-06-06 RX ADMIN — METHOCARBAMOL 500 MILLIGRAM(S): 500 TABLET, FILM COATED ORAL at 15:03

## 2024-06-06 RX ADMIN — NEBIVOLOL HYDROCHLORIDE 10 MILLIGRAM(S): 5 TABLET ORAL at 05:07

## 2024-06-06 RX ADMIN — METHOCARBAMOL 500 MILLIGRAM(S): 500 TABLET, FILM COATED ORAL at 05:07

## 2024-06-06 RX ADMIN — OXYCODONE HYDROCHLORIDE 10 MILLIGRAM(S): 5 TABLET ORAL at 12:21

## 2024-06-06 RX ADMIN — Medication 50 MILLIGRAM(S): at 05:07

## 2024-06-06 RX ADMIN — Medication 1000 MILLIGRAM(S): at 15:02

## 2024-06-06 RX ADMIN — DORZOLAMIDE HYDROCHLORIDE TIMOLOL MALEATE 1 DROP(S): 20; 5 SOLUTION/ DROPS OPHTHALMIC at 05:07

## 2024-06-06 RX ADMIN — LISINOPRIL 20 MILLIGRAM(S): 2.5 TABLET ORAL at 05:08

## 2024-06-06 RX ADMIN — Medication 40 MILLIEQUIVALENT(S): at 12:27

## 2024-06-06 RX ADMIN — PANTOPRAZOLE SODIUM 40 MILLIGRAM(S): 20 TABLET, DELAYED RELEASE ORAL at 05:08

## 2024-06-06 RX ADMIN — Medication 1000 MILLIGRAM(S): at 05:08

## 2024-06-06 NOTE — DIETITIAN INITIAL EVALUATION ADULT - ADD RECOMMEND
1. c/w current diet as tolerated   2. Provide ongoing education as needed.   3. Encourage PO intake and honor food preferences as able unless otherwise contraindicated.   4. Monitor PO intake, diet tolerance, weight trends, labs, and skin integrity and bowel movement regularity.   5. RDN will continue to monitor, reassess, and intervene as appropriate.

## 2024-06-06 NOTE — DIETITIAN INITIAL EVALUATION ADULT - OTHER INFO
Visited pt at bedside on 9WO. States that at home pt consumes 3 meals a day with no changes. Does not follow any therapeutic diet at home. No cultural, ethnic, [employer] food preferences noted. Confirms No known food allergies. States that he takes a multivitamin at home. Reports no additional use of oral nutritional supplement. Pt is tolerating current diet: Consistent Carbohydrate w/ No Evening Snack. Endorses having a good appetite while inpatient. RD observed pt consuming ~100% of breakfast. No difficulties chewing or swallowing reported per pt at time of visit. No issues with N/V/C/D at this time. Last BM noted on 6/5 per flow sheets. Discussed/Educated pt on the importance of consuming consistent carbohydrates throughout the day. Reviewed examples of carbohydrates on the menu. To pair carbohydrates with either a protein or fat during meal times. Educated/Discussed the importance to prioritize protein at meal times. Reviewed good sources of protein on the menu. Pt receptive and verbalizes understanding. Dosing weight: 214 pounds; UBW: ~211 pounds (per pt) endorses stable weight x 3 months. IBW: 154 pounds; %IBW: 138% Nutrition focused physical exam deferred at this time as pt is eating well and reports stable weight. Observed with no overt signs and symptoms of muscle or fat wasting. Based on ASPEN guidelines, pt does not meet criteria for malnutrition. No Pressure Injuries per flow sheets. Surgical incision: back; Geoff Score: 20. No Edema per flow sheets. Meds Reviewed. Lactated ringers, 8U Lantus, ISS, POCT ranges 174-255 past 24 hours. Nutritionally Pertinent Labs 6/6 K: 3.3 (L), Gluc: 160 (H). See Nutrition Recommendations below.

## 2024-06-06 NOTE — DISCHARGE NOTE NURSING/CASE MANAGEMENT/SOCIAL WORK - PATIENT PORTAL LINK FT
You can access the FollowMyHealth Patient Portal offered by [facility] by registering at the following website: [email or web address]. By joining [facility]’s FollowMyHealth portal, you will also be able to view your health information using other applications (apps) compatible with our system.

## 2024-06-06 NOTE — PROGRESS NOTE ADULT - NSPROGADDITIONALINFOA_GEN_ALL_CORE
-    Dispo: home pending drain removal. Can DC tele and transfer to [facility] if remains inpatient.    Recommendations and plan discussed with primary team.

## 2024-06-06 NOTE — DISCHARGE NOTE PROVIDER - NSDCMRMEDTOKEN_GEN_ALL_CORE_FT
acetaminophen 500 mg oral tablet: 2 tab(s) orally every 8 hours  atorvastatin 40 mg oral tablet: 1 tab(s) orally once a day  bimatoprost 0.03% ophthalmic solution: 1 drop(s) in each affected eye once a day (at bedtime)  dorzolamide-timolol 2.23%-0.68% (2%-0.5% base) ophthalmic solution: 1 drop(s) in each eye 2 times a day  febuxostat 40 mg oral tablet: 1 tab(s) orally once a day  glipiZIDE 5 mg oral tablet: 1 tab(s) orally once a day  MetFORMIN (Eqv-Fortamet) 1000 mg oral tablet, extended release: 1 tab(s) orally once a day  methocarbamol 500 mg oral tablet: 1 tab(s) orally every 8 hours as needed for muscle spasm  montelukast 10 mg oral tablet: 1 tab(s) orally once a day  nebivolol 10 mg oral tablet: 1 tab(s) orally once a day  oxyCODONE 5 mg oral tablet: 1 tab(s) orally every 6 hours as needed for moderate to severe postoperative pain may take 1-2 tablets every 4-6 hours as needed for  moderate to severe postoperative pain MDD: 6  polyethylene glycol 3350 oral powder for reconstitution: 17 gram(s) orally 2 times a day may take until bowel movement then as needed for constipation  pregabalin 50 mg oral capsule: 1 cap(s) orally 3 times a day MDD: 3  ramipril 5 mg oral tablet: orally once a day  Rybelsus 7 mg oral tablet: 1 tab(s) orally once a day  Trulicity Pen 1.5 mg/0.5 mL subcutaneous solution: 1.5 milligram(s) subcutaneously once a week

## 2024-06-06 NOTE — DISCHARGE NOTE PROVIDER - HOSPITAL COURSE
Admitted to Orthopedic service Dr. Crain  Surgery on 6/4 - L2-S1 decompression, L5-S1 PSF   Marlys-op Antibiotics  Pain control  DVT prophylaxis  OOB/Physical Therapy

## 2024-06-06 NOTE — PROGRESS NOTE ADULT - ASSESSMENT
63 YOM with PMH of T2DM, HTN, gout, glaucoma, allergies, lumbar spinal stenosis admitted for elective L2-S1 decompression, L5-S1 PSF/TLIF s/p OR with Dr. Crain on 6/5/24.  0

## 2024-06-06 NOTE — PROGRESS NOTE ADULT - PROBLEM SELECTOR PLAN 1
Post operative state. Now /p OR with Dr. Crain on 6/5/24 for elective L2-S1 decompression, L5-S1 PSF/TLIF.   - management per ortho, possible drain removal today  - pain control with bowel regimen  - frequent perioperative incentive spirometer use  - early ambulation and OOBTC as tolerated  - chemical DVT ppx per primary - on LMWH

## 2024-06-06 NOTE — PROGRESS NOTE ADULT - PROBLEM SELECTOR PLAN 6
Home: nebivolol 10mg daily, ramipril 5mg daily  - cont nebivolol 10mg, lisinopril 20mg (therapeutic exchange)

## 2024-06-06 NOTE — PROGRESS NOTE ADULT - PROBLEM SELECTOR PLAN 2
Acute blood loss anemia. HDS and asymptomatic w/o active s/s bleeding. Partially 2/2 operative losses  - Hgb 13.7 --> 9.0  - transfuse to maintain Hgb >7

## 2024-06-06 NOTE — DIETITIAN INITIAL EVALUATION ADULT - PROBLEM SELECTOR PLAN 1
Admit to Orthopaedic Service.  Presents today for elective  L2-S1 decompression, L5-S1 transforaminal lumbar interbody fusion, posterior spinal fusion  Pt medically stable and cleared for procedure today by Dr. Fontenot

## 2024-06-06 NOTE — DIETITIAN INITIAL EVALUATION ADULT - PERTINENT MEDS FT
MEDICATIONS  (STANDING):  acetaminophen     Tablet .. 1000 milliGRAM(s) Oral every 8 hours  allopurinol 100 milliGRAM(s) Oral daily  atorvastatin 40 milliGRAM(s) Oral at bedtime  dextrose 10% Bolus 125 milliLiter(s) IV Bolus once  dextrose 5%. 1000 milliLiter(s) (100 mL/Hr) IV Continuous <Continuous>  dextrose 5%. 1000 milliLiter(s) (50 mL/Hr) IV Continuous <Continuous>  dextrose 50% Injectable 12.5 Gram(s) IV Push once  dextrose 50% Injectable 25 Gram(s) IV Push once  dorzolamide 2%/timolol 0.5% Ophthalmic Solution 1 Drop(s) Both EYES two times a day  enoxaparin Injectable 40 milliGRAM(s) SubCutaneous every 24 hours  glucagon  Injectable 1 milliGRAM(s) IntraMuscular once  insulin glargine Injectable (LANTUS) 8 Unit(s) SubCutaneous at bedtime  insulin lispro (ADMELOG) corrective regimen sliding scale   SubCutaneous Before meals and at bedtime  lactated ringers. 1000 milliLiter(s) (140 mL/Hr) IV Continuous <Continuous>  latanoprost 0.005% Ophthalmic Solution 1 Drop(s) Both EYES at bedtime  lisinopril 20 milliGRAM(s) Oral daily  methocarbamol 500 milliGRAM(s) Oral every 8 hours  montelukast 10 milliGRAM(s) Oral daily  nebivolol 10 milliGRAM(s) Oral daily  pantoprazole    Tablet 40 milliGRAM(s) Oral before breakfast  polyethylene glycol 3350 17 Gram(s) Oral two times a day  pregabalin 50 milliGRAM(s) Oral three times a day  senna 2 Tablet(s) Oral at bedtime    MEDICATIONS  (PRN):  aluminum hydroxide/magnesium hydroxide/simethicone Suspension 30 milliLiter(s) Oral every 12 hours PRN Indigestion  bisacodyl 5 milliGRAM(s) Oral every 12 hours PRN Constipation  dextrose Oral Gel 15 Gram(s) Oral once PRN Blood Glucose LESS THAN 70 milliGRAM(s)/deciliter  HYDROmorphone  Injectable 0.5 milliGRAM(s) IV Push every 15 minutes PRN breakthrough pain  HYDROmorphone  Injectable 0.5 milliGRAM(s) IV Push every 4 hours PRN breakthrough pain  ondansetron Injectable 4 milliGRAM(s) IV Push every 6 hours PRN Nausea and/or Vomiting  oxyCODONE    IR 5 milliGRAM(s) Oral every 4 hours PRN Moderate Pain (4 - 6)  oxyCODONE    IR 10 milliGRAM(s) Oral every 4 hours PRN Severe Pain (7 - 10)

## 2024-06-06 NOTE — PROGRESS NOTE ADULT - SUBJECTIVE AND OBJECTIVE BOX
SUBJECTIVE: NAEON. Patient seen this morning resting in bed. States he was able to walk with PT yesterday and is hopeful to be able to return home today (pending drains). No other acute complaints. Voiding w/o issues, has not had BM yet but no abd pain and tolerating PO.       DIET:  Diet, Consistent Carbohydrate/No Snacks (06-05-24 @ 09:50) [Active]      MEDICATIONS:  MEDICATIONS  (STANDING):  acetaminophen     Tablet .. 1000 milliGRAM(s) Oral every 8 hours  allopurinol 100 milliGRAM(s) Oral daily  atorvastatin 40 milliGRAM(s) Oral at bedtime  dextrose 10% Bolus 125 milliLiter(s) IV Bolus once  dextrose 5%. 1000 milliLiter(s) (100 mL/Hr) IV Continuous <Continuous>  dextrose 5%. 1000 milliLiter(s) (50 mL/Hr) IV Continuous <Continuous>  dextrose 50% Injectable 12.5 Gram(s) IV Push once  dextrose 50% Injectable 25 Gram(s) IV Push once  dorzolamide 2%/timolol 0.5% Ophthalmic Solution 1 Drop(s) Both EYES two times a day  enoxaparin Injectable 40 milliGRAM(s) SubCutaneous every 24 hours  glucagon  Injectable 1 milliGRAM(s) IntraMuscular once  insulin glargine Injectable (LANTUS) 8 Unit(s) SubCutaneous at bedtime  insulin lispro (ADMELOG) corrective regimen sliding scale   SubCutaneous Before meals and at bedtime  lactated ringers. 1000 milliLiter(s) (140 mL/Hr) IV Continuous <Continuous>  latanoprost 0.005% Ophthalmic Solution 1 Drop(s) Both EYES at bedtime  lisinopril 20 milliGRAM(s) Oral daily  methocarbamol 500 milliGRAM(s) Oral every 8 hours  montelukast 10 milliGRAM(s) Oral daily  nebivolol 10 milliGRAM(s) Oral daily  pantoprazole    Tablet 40 milliGRAM(s) Oral before breakfast  polyethylene glycol 3350 17 Gram(s) Oral two times a day  pregabalin 50 milliGRAM(s) Oral three times a day  senna 2 Tablet(s) Oral at bedtime    MEDICATIONS  (PRN):  aluminum hydroxide/magnesium hydroxide/simethicone Suspension 30 milliLiter(s) Oral every 12 hours PRN Indigestion  bisacodyl 5 milliGRAM(s) Oral every 12 hours PRN Constipation  dextrose Oral Gel 15 Gram(s) Oral once PRN Blood Glucose LESS THAN 70 milliGRAM(s)/deciliter  HYDROmorphone  Injectable 0.5 milliGRAM(s) IV Push every 15 minutes PRN breakthrough pain  HYDROmorphone  Injectable 0.5 milliGRAM(s) IV Push every 4 hours PRN breakthrough pain  ondansetron Injectable 4 milliGRAM(s) IV Push every 6 hours PRN Nausea and/or Vomiting  oxyCODONE    IR 5 milliGRAM(s) Oral every 4 hours PRN Moderate Pain (4 - 6)  oxyCODONE    IR 10 milliGRAM(s) Oral every 4 hours PRN Severe Pain (7 - 10)      Allergies    No Known Allergies    Intolerances        OBJECTIVE:  Vital Signs Last 24 Hrs  T(C): 36.7 (06 Jun 2024 08:50), Max: 36.7 (06 Jun 2024 00:02)  T(F): 98 (06 Jun 2024 08:50), Max: 98.1 (06 Jun 2024 00:02)  HR: 84 (06 Jun 2024 08:50) (77 - 85)  BP: 132/68 (06 Jun 2024 08:50) (130/76 - 144/79)  BP(mean): --  RR: 118 (06 Jun 2024 08:50) (15 - 118)  SpO2: 98% (06 Jun 2024 08:50) (96% - 98%)    Parameters below as of 06 Jun 2024 08:50  Patient On (Oxygen Delivery Method): room air      I&O's Summary    05 Jun 2024 07:01  -  06 Jun 2024 07:00  --------------------------------------------------------  IN: 0 mL / OUT: 2425 mL / NET: -2425 mL        PHYSICAL EXAM:  Gen: appears stated age, resting comfortably, NAD  HEENT: NCAT, MMM  Neck: supple  CV: RRR, no m/r/g, peripheral pulses 2+  Pulm: CTAB, no increased work of breathing, no rales/rhonchi  Abd: soft, ND, NT, no rebound or guarding  Skin: warm and dry  Ext: non-tender, no edema; lumbar dressing CDI with drains in place  Neuro: AOx3, speaking in full sentences  Psych: affect and behavior appropriate, pleasant at time of interview    LABS:                        9.0    10.40 )-----------( 138      ( 06 Jun 2024 05:30 )             26.5     06-06    142  |  108  |  14  ----------------------------<  160<H>  3.3<L>   |  25  |  0.76    Ca    8.4      06 Jun 2024 05:30        PT/INR - ( 05 Jun 2024 07:00 )   PT: 12.7 sec;   INR: 1.12          PTT - ( 05 Jun 2024 07:00 )  PTT:24.6 sec  CAPILLARY BLOOD GLUCOSE      POCT Blood Glucose.: 240 mg/dL (06 Jun 2024 11:17)  POCT Blood Glucose.: 174 mg/dL (06 Jun 2024 07:07)  POCT Blood Glucose.: 195 mg/dL (05 Jun 2024 21:57)  POCT Blood Glucose.: 255 mg/dL (05 Jun 2024 17:14)    Urinalysis Basic - ( 06 Jun 2024 05:30 )    Color: x / Appearance: x / SG: x / pH: x  Gluc: 160 mg/dL / Ketone: x  / Bili: x / Urobili: x   Blood: x / Protein: x / Nitrite: x   Leuk Esterase: x / RBC: x / WBC x   Sq Epi: x / Non Sq Epi: x / Bacteria: x        MICRODATA:      RADIOLOGY/OTHER STUDIES:  Reviewed

## 2024-06-06 NOTE — DIETITIAN INITIAL EVALUATION ADULT - NAME AND PHONE
.Tyron Hood, RD

## 2024-06-06 NOTE — PROGRESS NOTE ADULT - SUBJECTIVE AND OBJECTIVE BOX
Procedure:  L2-S1 decompression, L5-S1 PSF/TLIF   Surgeon: Dr. SAMANTHA Crain    Pt comfortable without complaints, pain controlled  Denies CP, SOB, N/V, numbness/tingling     Vital Signs Last 24 Hrs  T(C): 36.6 (06-05-24 @ 00:02), Max: 36.6 (06-05-24 @ 00:02)  T(F): 97.9 (06-05-24 @ 00:02), Max: 97.9 (06-05-24 @ 00:02)  HR: 80 (06-05-24 @ 00:02) (80 - 80)  BP: 161/85 (06-05-24 @ 00:02) (161/85 - 161/85)  BP(mean): --  RR: 18 (06-05-24 @ 00:02) (18 - 18)  SpO2: 99% (06-05-24 @ 00:02) (99% - 99%)    General: Pt Alert and oriented, NAD  DSG C/D/I  Drains: hv x 2, delphine x 1   Pulses: 2+ DP  FHL/GS 4/5 bilateral lower extremities  Right lower extremity: EHL 3/5, TA 5/5   Left lower extremity:  EHL 4/5, TA 5/5   SLT in tact to distal bilateral lower extremities    DP pulses palpable bilaterally     A/P: 63yMale s/p L2-S1 decompression, L5-S1 PSF/TLIF by Dr. SAMANTHA Crain on 06-05  - Stable  - Pain Control  - DVT ppx: SCDs  - Post op abx: Ancef  - Drains: HV x 2, DELPHINE x 1   - WBS: WBAT  - passed tov   - PT eval - hpt pending clearance and drains     Ortho Pager [phone]

## 2024-06-06 NOTE — DIETITIAN INITIAL EVALUATION ADULT - PERTINENT LABORATORY DATA
06-06    142  |  108  |  14  ----------------------------<  160<H>  3.3<L>   |  25  |  0.76    Ca    8.4      06 Jun 2024 05:30    POCT Blood Glucose.: 240 mg/dL (06-06-24 @ 11:17)

## 2024-06-06 NOTE — DIETITIAN INITIAL EVALUATION ADULT - OTHER CALCULATIONS
Ideal body weight (69.8kg) used to calculate energy needs due to pt's current body weight exceeds % (138%) body weight. Needs adjusted for advanced age and s/p sx

## 2024-06-06 NOTE — DISCHARGE NOTE PROVIDER - NSDCFUADDINST_GEN_ALL_CORE_FT
No strenuous activity (bending/twisting), heavy lifting, driving or returning to work until cleared by MD.    DRESSING/SHOWERING:   (AQUACEL – brown gel dressing)   - You have an AQUACEL dressing overlying your incision and gauze/tegaderm overlying drain sites.  You may shower, your dressing is water-resistant. Do not soak in bathtubs. Remove dressing after postop day 7, then leave incision open to air. You may do this yourself (simply peel it off), or you may ask for assistance from your visiting nurse. Keep your incision clean and dry. Do not pick at your incision. Do not apply creams, ointments or oils to your incision until cleared by your surgeon. Do not soak your incision in sitting water (ie tubs, pools, lakes, etc.) until cleared by your surgeon. Do not scrub the incision – instead, allow soap and water to flow over the incision and then pat it dry with a clean towel.     MEDICATION/ANTICOAGULATION:   - You have been prescribed medications for pain:      - Tylenol for mild to moderate pain. Do not exceed 3,000mg daily.     - For more severe pain, take Tylenol with the addition of narcotic pain medication. Take this medication as prescribed. This medication may cause drowsiness or dizziness. Do not operate machinery. This medication may cause constipation.   - For any additional medications, follow instructions on the bottle.    -Try to have regular bowel movements. Take stool softener or laxative if necessary. You may wish to take Miralax daily until you have regular bowel movements.    - If you have a pain management physician, please follow-up with them postoperatively.    - If you experience any negative side effects of your medications, please call your surgeon's office to discuss.     FOLLOW-UP:   - Call to schedule an appt with Dr. Crain for follow up.  - Please follow-up with your primary care physician or any other specialist you see postoperatively, if needed.    - Contact your doctor or go to the emergency room if you experience: fever greater than 101.5, chills, chest pain, difficulty breathing, redness or excessive drainage around the incision, other concerns.  No strenuous activity (bending/twisting), heavy lifting, driving or returning to work until cleared by MD.    DRESSING/SHOWERING:   (AQUACEL – brown gel dressing)   - You have an AQUACEL dressing overlying your incision and gauze/tegaderm overlying drain sites.  You may shower, your dressing is water-resistant. Do not soak in bathtubs. Keep intact until you follow up with Dr. Crain on Monday 6/10.  Keep your incision clean and dry. Do not pick at your incision. Do not apply creams, ointments or oils to your incision until cleared by your surgeon. Do not soak your incision in sitting water (ie tubs, pools, lakes, etc.) until cleared by your surgeon. Do not scrub the incision – instead, allow soap and water to flow over the incision and then pat it dry with a clean towel.     MEDICATION/ANTICOAGULATION:   - You have been prescribed medications for pain:      - Tylenol for mild to moderate pain. Do not exceed 3,000mg daily.     - For more severe pain, take Tylenol with the addition of narcotic pain medication. Take this medication as prescribed. This medication may cause drowsiness or dizziness. Do not operate machinery. This medication may cause constipation.   - For any additional medications, follow instructions on the bottle.    -Try to have regular bowel movements. Take stool softener or laxative if necessary. You may wish to take Miralax daily until you have regular bowel movements.    - If you have a pain management physician, please follow-up with them postoperatively.    - If you experience any negative side effects of your medications, please call your surgeon's office to discuss.     FOLLOW-UP:   - Call to schedule an appt with Dr. Crain for follow up.  - Please follow-up with your primary care physician or any other specialist you see postoperatively, if needed.    - Contact your doctor or go to the emergency room if you experience: fever greater than 101.5, chills, chest pain, difficulty breathing, redness or excessive drainage around the incision, other concerns.

## 2024-06-06 NOTE — DIETITIAN INITIAL EVALUATION ADULT - EDUCATION DIETARY MODIFICATIONS
Discussed/Educated pt on the importance of consuming consistent carbohydrates throughout the day. Reviewed examples of carbohydrates on the menu. To pair carbohydrates with either a protein or fat during meal times. Educated/Discussed the importance to prioritize protein at meal times. Reviewed good sources of protein on the menu. Pt receptive and verbalizes understanding./(2) meets goals/outcomes/verbalization

## 2024-06-06 NOTE — DISCHARGE NOTE PROVIDER - NSDCCPTREATMENT_GEN_ALL_CORE_FT
PRINCIPAL PROCEDURE  Procedure: Fusion, posterior spinal column, lumbar, 1 level, posterior approach  Findings and Treatment: L2-S1 decompression

## 2024-06-06 NOTE — DISCHARGE NOTE PROVIDER - CARE PROVIDER_API CALL
Silas Crain  Orthopaedic Surgery  [address] [place] [id number]  Phone: [phone]  Fax: [phone]  Follow Up Time:

## 2024-06-06 NOTE — PROGRESS NOTE ADULT - PROBLEM SELECTOR PLAN 5
HbA1C 7.8 on outpatient labs. Home: glipizide 5mg, metformin 1000mg BID, semaglutide 7mg daily, dulaglutide 1.5mg qweekly  - if remains inpatient, can increase glargine to 8u qhs and cont ISS with FSG ACHS  - if discharged, can resume home regimen on discharge

## 2024-06-12 DIAGNOSIS — I10 ESSENTIAL (PRIMARY) HYPERTENSION: ICD-10-CM

## 2024-06-12 DIAGNOSIS — E87.6 HYPOKALEMIA: ICD-10-CM

## 2024-06-12 DIAGNOSIS — M48.061 SPINAL STENOSIS, LUMBAR REGION WITHOUT NEUROGENIC CLAUDICATION: ICD-10-CM

## 2024-06-12 DIAGNOSIS — E11.9 TYPE 2 DIABETES MELLITUS WITHOUT COMPLICATIONS: ICD-10-CM

## 2024-06-12 DIAGNOSIS — M10.9 GOUT, UNSPECIFIED: ICD-10-CM

## 2024-06-12 DIAGNOSIS — D69.59 OTHER SECONDARY THROMBOCYTOPENIA: ICD-10-CM

## 2024-06-12 DIAGNOSIS — D62 ACUTE POSTHEMORRHAGIC ANEMIA: ICD-10-CM

## (undated) DEVICE — DRSG TEGADERM 3.5X6"

## (undated) DEVICE — DRAPE SHOWER CURTAIN ISOLATION

## (undated) DEVICE — DRSG TEGADERM 4X4.75"

## (undated) DEVICE — SUT MONOCRYL 3-0 18" PS-1

## (undated) DEVICE — MARKING PEN W RULER

## (undated) DEVICE — BLADE SURGICAL #11 CARBON

## (undated) DEVICE — DRAPE INSTRUMENT POUCH 6.75" X 11"

## (undated) DEVICE — SUT VICRYL 2-0 27" SH UNDYED

## (undated) DEVICE — MIDAS REX MR8 BALL FLUTED SM BORE 3MM X 10CM

## (undated) DEVICE — SUT VICRYL 2-0 27" CP-1 UNDYED

## (undated) DEVICE — NDL SPINAL 18G X 3.5" (PINK)

## (undated) DEVICE — MIDAS REX MR8 MATCH HEAD FLUTED SM BORE 3MM X 10CM

## (undated) DEVICE — DRAPE C ARM 41X74"

## (undated) DEVICE — SUT VICRYL PLUS 2-0 18" CT-2 (POP-OFF)

## (undated) DEVICE — MIDAS REX MR7 LUBRICANT DIFFUSER CARTRIDGE

## (undated) DEVICE — Device

## (undated) DEVICE — SYR LUER LOK 10CC

## (undated) DEVICE — DRSG BIOPATCH DISK W CHG 1" W 4.0MM HOLE

## (undated) DEVICE — TUBING XTRA AAL ASPIRATION AND ANTICOAGULATION LINE 1/4"

## (undated) DEVICE — SUT POLYSORB 2-0 30" V-20 UNDYED

## (undated) DEVICE — SUT POLYSORB 0 36" GS-21 UNDYED

## (undated) DEVICE — PACK MINOR

## (undated) DEVICE — SUT POLYSORB 3-0 18" P-12 UNDYED

## (undated) DEVICE — DRAIN JACKSON PRATT 3 SPRING RESERVOIR W 10FR PVC DRAIN

## (undated) DEVICE — SOL IRR POUR H2O 250ML

## (undated) DEVICE — SYR LUER LOK 20CC

## (undated) DEVICE — PREP CHLORAPREP TEAL 26ML

## (undated) DEVICE — POSITIONER FOAM EGG CRATE ULNAR 2PCS (PINK)

## (undated) DEVICE — LAP PAD 18 X 18"

## (undated) DEVICE — SUT ETHIBOND 1 30" CTX

## (undated) DEVICE — SUT VICRYL 0 18" CT-2 (POP-OFF)

## (undated) DEVICE — GLV 8.5 PROTEXIS (WHITE)

## (undated) DEVICE — DRSG STERISTRIPS 0.5 X 4"

## (undated) DEVICE — PREP CHLORAPREP HI-LITE ORANGE 26ML

## (undated) DEVICE — NDL HYPO SAFE 22G X 1.5" (BLACK)

## (undated) DEVICE — BUR STRYKER MULTI FLUTE ROUND 5MM

## (undated) DEVICE — WARMING BLANKET LOWER ADULT

## (undated) DEVICE — GLV 7.5 PROTEXIS (WHITE)

## (undated) DEVICE — SUT VICRYL 0 18" OS-6 UNDYED (POP-OFF)

## (undated) DEVICE — DRAPE SPLIT SHEET 77" X 120"

## (undated) DEVICE — DRAPE 3/4 SHEET 52X76"

## (undated) DEVICE — SUT ETHILON 3-0 18" PS-1

## (undated) DEVICE — SUT VICRYL 1 36" CTB-1 UNDYED

## (undated) DEVICE — TOOL MR8 14CM SP MATCH 3MM

## (undated) DEVICE — STRYKER BONE MILL BLADE MEDIUM 5.0MM

## (undated) DEVICE — DRAPE HEAVY DUTY TOP 59" X 112"

## (undated) DEVICE — DRAIN JACKSON PRATT 10MM FLAT 3/4 NO TROCAR

## (undated) DEVICE — DRAPE MICROSCOPE LEICA MINI

## (undated) DEVICE — DRSG DERMABOND PRINEO 22CM

## (undated) DEVICE — DRSG MASTISOL

## (undated) DEVICE — BLADE SURGICAL #15 CARBON

## (undated) DEVICE — SUT PDS II 0 27" CT-1

## (undated) DEVICE — BLADE SCALPEL SAFETYLOCK #15

## (undated) DEVICE — SUT VICRYL 2-0 27" CT-1 UNDYED

## (undated) DEVICE — SPONGE SURGICAL STRIP 1" X 6"

## (undated) DEVICE — SUT VICRYL 1 27" OS-8 UNDYED

## (undated) DEVICE — VENODYNE/SCD SLEEVE CALF MEDIUM

## (undated) DEVICE — MIDAS REX LEGEND BALL FLUTED LG BORE 3.0MM X 14CM

## (undated) DEVICE — MIDAS REX MR8 BALL FLUTED SM BORE 4MM X 10CM

## (undated) DEVICE — SUT PDS II 2-0 27" CT-2

## (undated) DEVICE — ELCTR AQUAMANTYS BIPOLAR SEALER 6.0

## (undated) DEVICE — GLV 8 SENSICARE W ALOE

## (undated) DEVICE — SUT STRATAFIX SYMMETRIC PDS 1 45CM OS-6

## (undated) DEVICE — GLV 8.5 PROTEXIS ORTHO (CREAM)

## (undated) DEVICE — ELCTR BOVIE TIP BLADE INSULATED 6.5" EDGE

## (undated) DEVICE — SUT SILK 2-0 30" PSL

## (undated) DEVICE — DRAPE LIGHT HANDLE COVER (GREEN)

## (undated) DEVICE — DRAPE BACK TABLE COVER 80X90"

## (undated) DEVICE — SUT MONOCRYL 4-0 27" PS-2 UNDYED

## (undated) DEVICE — POSITIONER JACKSON TABLE XODUS

## (undated) DEVICE — GLV 7 PROTEXIS (WHITE)

## (undated) DEVICE — PACK SPINE

## (undated) DEVICE — MEDICATION LABELS W MARKER

## (undated) DEVICE — WARMING BLANKET UPPER ADULT

## (undated) DEVICE — SOL IRR POUR NS 0.9% 500ML

## (undated) DEVICE — CATH IV OPTIVA 16G X 2"

## (undated) DEVICE — DRAPE ISOLATION W INCISE FILM & POUCH

## (undated) DEVICE — ELCTR STRYKER NEPTUNE SMOKE EVACUATION PENCIL (GREEN)

## (undated) DEVICE — GLV 7.5 SENSICARE W ALOE

## (undated) DEVICE — BLADE SCALPEL SAFETYLOCK #11

## (undated) DEVICE — GLV 8 PROTEXIS (WHITE)

## (undated) DEVICE — NDL T HANDLE JAMSHIDI 11G 6"

## (undated) DEVICE — SUT MONOCRYL 3-0 27" PS-2 UNDYED

## (undated) DEVICE — TUBING SMOKE EVAC 3/8" X 10FT FOR NEPTUNE